# Patient Record
Sex: MALE | Race: WHITE | NOT HISPANIC OR LATINO | Employment: FULL TIME | URBAN - METROPOLITAN AREA
[De-identification: names, ages, dates, MRNs, and addresses within clinical notes are randomized per-mention and may not be internally consistent; named-entity substitution may affect disease eponyms.]

---

## 2018-01-18 NOTE — MISCELLANEOUS
Message  Return to work or school:   Adriana Alvarez is under my professional care  He was seen in my office on November 8, 2016   He is able to return to work on  November 9, 2016            Signatures   Electronically signed by :  Erinn El, ; Nov 8 2016  5:43PM EST                       (Author)

## 2019-01-18 ENCOUNTER — OFFICE VISIT (OUTPATIENT)
Dept: URGENT CARE | Facility: CLINIC | Age: 24
End: 2019-01-18
Payer: COMMERCIAL

## 2019-01-18 VITALS
DIASTOLIC BLOOD PRESSURE: 90 MMHG | WEIGHT: 144.8 LBS | TEMPERATURE: 98.9 F | SYSTOLIC BLOOD PRESSURE: 135 MMHG | RESPIRATION RATE: 20 BRPM | HEART RATE: 76 BPM | HEIGHT: 69 IN | OXYGEN SATURATION: 97 % | BODY MASS INDEX: 21.45 KG/M2

## 2019-01-18 DIAGNOSIS — K08.89 PAIN, DENTAL: Primary | ICD-10-CM

## 2019-01-18 PROCEDURE — 99213 OFFICE O/P EST LOW 20 MIN: CPT | Performed by: PHYSICIAN ASSISTANT

## 2019-01-18 PROCEDURE — S9088 SERVICES PROVIDED IN URGENT: HCPCS | Performed by: PHYSICIAN ASSISTANT

## 2019-01-18 RX ORDER — MELATONIN 10 MG
1 TABLET, SUBLINGUAL SUBLINGUAL DAILY
COMMUNITY

## 2019-01-18 RX ORDER — PENICILLIN V POTASSIUM 500 MG/1
500 TABLET ORAL EVERY 6 HOURS SCHEDULED
Qty: 28 TABLET | Refills: 0 | Status: SHIPPED | OUTPATIENT
Start: 2019-01-18 | End: 2019-01-25

## 2019-01-18 RX ORDER — OMEGA-3 FATTY ACIDS/FISH OIL 300-1000MG
1 CAPSULE ORAL
COMMUNITY

## 2019-01-18 NOTE — PROGRESS NOTES
[facility] [employer] [facility]        NAME: Olinda Villeda is a 24 y o  male  : 1995    MRN: [id number]  DATE: 2019  TIME: 12:40 PM    Assessment and Plan   Pain, dental [K08 89]  1  Pain, dental  penicillin V potassium (VEETID) 500 mg tablet     No evidence of acute infection or abscess  Discussed topical therapies to try and avoidance of hot and cold liquids  Patient complains of pressure under the tooth at times  A prescription for penicillin was sent to the pharmacy but patient agrees that he will not start any antibiotics unless the topical therapies do not work for his symptoms get worse  He has plans to see his dentist on Monday or Tuesday, they were booked for today  Patient Instructions     Patient Instructions   Try conservative topical measures 1st but if symptoms worsen, he developed swelling, fever or increased pain he can begin the antibiotic until you can follow-up with your dentist         Proceed to  ER if symptoms worsen  Chief Complaint     Chief Complaint   Patient presents with    Dental Pain     patient reports he has had pain right lower back molar x 2 days,with sensitiviy to heat and cold  unable to get into dentist Took Ibuprofen 400mg at 0830 with short term releif  History of Present Illness       Patient presents with chief complaint of right lower dental pain for the past 2 days  He states he had a filling placed in this tooth in November  He reports after having the dental work the filling felt large and he would bite down on it from time to time  He had some pain in the tooth initially but resolved  He states that the tooth is very sensitive to hot and cold  The pain is described as sharp at times radiating in the lower jaw or up  He reports feeling occasional pressure beneath the 2s  He is taking ibuprofen currently  He denies any fever  No injury to the tooth          Review of Systems   Review of Systems   Constitutional: Negative for chills and fever    HENT: Positive for dental problem  Allergic/Immunologic: Negative  Hematological: Negative  Current Medications       Current Outpatient Prescriptions:     Cholecalciferol (VITAMIN D3) 64968 units TABS, Take 1 tablet by mouth daily, Disp: , Rfl:     Ibuprofen 200 MG CAPS, Take 1 capsule by mouth, Disp: , Rfl:     penicillin V potassium (VEETID) 500 mg tablet, Take 1 tablet (500 mg total) by mouth every 6 (six) hours for 7 days, Disp: 28 tablet, Rfl: 0    Current Allergies     Allergies as of 01/18/2019    (No Known Allergies)            The following portions of the patient's history were reviewed and updated as appropriate: allergies, current medications, past family history, past medical history, past social history, past surgical history and problem list      History reviewed  No pertinent past medical history  History reviewed  No pertinent surgical history  No family history on file  Medications have been verified  Objective   /90   Pulse 76   Temp 98 9 °F (37 2 °C)   Resp 20   Ht 5' 9" (1 753 m)   Wt 65 7 kg (144 lb 12 8 oz)   SpO2 97%   BMI 21 38 kg/m²        Physical Exam     Physical Exam   Constitutional: He is oriented to person, place, and time  HENT:   Mouth/Throat: Oropharynx is clear and moist and mucous membranes are normal    Lower right molar with white filling  The filling is not visibly broken  Teeth are in very good repair  There is no swelling at the gum line or redness  The tooth is not tender to palpation  Cardiovascular: Normal rate and regular rhythm  Pulmonary/Chest: Effort normal and breath sounds normal    Lymphadenopathy:        Head (right side): No submental and no submandibular adenopathy present  Head (left side): No submental and no submandibular adenopathy present  He has no cervical adenopathy  Neurological: He is alert and oriented to person, place, and time     Nursing note and vitals reviewed

## 2019-08-09 ENCOUNTER — OFFICE VISIT (OUTPATIENT)
Dept: FAMILY MEDICINE CLINIC | Facility: CLINIC | Age: 24
End: 2019-08-09
Payer: COMMERCIAL

## 2019-08-09 VITALS
SYSTOLIC BLOOD PRESSURE: 120 MMHG | HEIGHT: 69 IN | RESPIRATION RATE: 12 BRPM | HEART RATE: 69 BPM | BODY MASS INDEX: 21.45 KG/M2 | DIASTOLIC BLOOD PRESSURE: 90 MMHG | OXYGEN SATURATION: 98 % | WEIGHT: 144.8 LBS

## 2019-08-09 DIAGNOSIS — Z23 NEED FOR TETANUS BOOSTER: ICD-10-CM

## 2019-08-09 DIAGNOSIS — Z23 NEED FOR VARICELLA VACCINE: ICD-10-CM

## 2019-08-09 DIAGNOSIS — Z00.00 ANNUAL PHYSICAL EXAM: Primary | ICD-10-CM

## 2019-08-09 DIAGNOSIS — Z23 NEED FOR MENINGOCOCCAL VACCINATION: ICD-10-CM

## 2019-08-09 PROCEDURE — 90715 TDAP VACCINE 7 YRS/> IM: CPT

## 2019-08-09 PROCEDURE — 90716 VAR VACCINE LIVE SUBQ: CPT

## 2019-08-09 PROCEDURE — 90471 IMMUNIZATION ADMIN: CPT

## 2019-08-09 PROCEDURE — 99385 PREV VISIT NEW AGE 18-39: CPT | Performed by: NURSE PRACTITIONER

## 2019-08-09 PROCEDURE — 90734 MENACWYD/MENACWYCRM VACC IM: CPT

## 2019-08-09 NOTE — PATIENT INSTRUCTIONS

## 2019-08-09 NOTE — PROGRESS NOTES
ADULT ANNUAL PHYSICAL  [facility]    NAME: Millie Malcolm  AGE: 21 y o  SEX: male  : 1995     DATE: 2019     Assessment and Plan:     Problem List Items Addressed This Visit     None      Visit Diagnoses     Annual physical exam    -  Primary      here as a new patient  I take care of his parents      Immunizations and preventive care screenings were discussed with patient today  Appropriate education was printed on patient's after visit summary  Counseling:  · Dental Health: discussed importance of regular tooth brushing, flossing, and dental visits  Return in 1 year (on 2020)  Chief Complaint:     Chief Complaint   Patient presents with    Physical Exam     pt here for physical       History of Present Illness:     Adult Annual Physical   Patient here for a comprehensive physical exam  The patient reports no problems  Diet and Physical Activity  · Diet/Nutrition: well balanced diet  · Exercise: 3-4 times a week on average  Depression Screening  PHQ-9 Depression Screening    PHQ-9:    Frequency of the following problems over the past two weeks:            General Health  · Sleep: sleeps well  · Hearing: normal - bilateral   · Vision: no vision problems  · Dental: regular dental visits   Health  · History of STDs?: no      Review of Systems:     Review of Systems   Constitutional: Negative for fatigue and fever  HENT: Negative for congestion, postnasal drip and rhinorrhea  Eyes: Negative for photophobia and visual disturbance  Gastrointestinal: Positive for nausea  Negative for constipation, diarrhea and vomiting  Genitourinary: Negative for dysuria, frequency and hematuria  Musculoskeletal: Positive for back pain  Negative for arthralgias, myalgias and neck stiffness  Skin: Negative for rash  Neurological: Negative for dizziness, light-headedness and headaches  Hematological: Negative for adenopathy  Psychiatric/Behavioral: Negative for decreased concentration and dysphoric mood  The patient is not nervous/anxious  Past Medical History:     History reviewed  No pertinent past medical history  Past Surgical History:     Past Surgical History:   Procedure Laterality Date    TOOTH EXTRACTION        Social History:     Social History     Socioeconomic History    Marital status: Single     Spouse name: None    Number of children: None    Years of education: None    Highest education level: None   Occupational History    None   Social Needs    Financial resource strain: None    Food insecurity:     Worry: None     Inability: None    Transportation needs:     Medical: None     Non-medical: None   Tobacco Use    Smoking status: Never Smoker    Smokeless tobacco: Never Used   Substance and Sexual Activity    Alcohol use: Yes    Drug use: None    Sexual activity: None   Lifestyle    Physical activity:     Days per week: None     Minutes per session: None    Stress: None   Relationships    Social connections:     Talks on phone: None     Gets together: None     Attends Faith service: None     Active member of club or organization: None     Attends meetings of clubs or organizations: None     Relationship status: None    Intimate partner violence:     Fear of current or ex partner: None     Emotionally abused: None     Physically abused: None     Forced sexual activity: None   Other Topics Concern    None   Social History Narrative    Drinks coffee      Family History:     History reviewed  No pertinent family history  Current Medications:     Current Outpatient Medications   Medication Sig Dispense Refill    Cholecalciferol (VITAMIN D3) 36811 units TABS Take 1 tablet by mouth daily      Ibuprofen 200 MG CAPS Take 1 capsule by mouth       No current facility-administered medications for this visit         Allergies:     No Known Allergies   Physical Exam:     /90 (BP Location: Left arm, Patient Position: Sitting, Cuff Size: Standard)   Pulse 69   Resp 12   Ht 5' 9" (1 753 m)   Wt 65 7 kg (144 lb 12 8 oz)   SpO2 98%   BMI 21 38 kg/m²       Family History   Problem Relation Age of Onset    Asthma Mother     LEONIDES disease Father      Past Medical History:   Diagnosis Date    Lyme arthritis ([facility] )      Social History     Socioeconomic History    Marital status: Single     Spouse name: Not on file    Number of children: Not on file    Years of education: Not on file    Highest education level: Not on file   Occupational History    Not on file   Social Needs    Financial resource strain: Not on file    Food insecurity:     Worry: Not on file     Inability: Not on file    Transportation needs:     Medical: Not on file     Non-medical: Not on file   Tobacco Use    Smoking status: Never Smoker    Smokeless tobacco: Never Used   Substance and Sexual Activity    Alcohol use: Yes     Frequency: Monthly or less    Drug use: Never    Sexual activity: Not Currently   Lifestyle    Physical activity:     Days per week: Not on file     Minutes per session: Not on file    Stress: Not on file   Relationships    Social connections:     Talks on phone: Not on file     Gets together: Not on file     Attends Mandaeism service: Not on file     Active member of club or organization: Not on file     Attends meetings of clubs or organizations: Not on file     Relationship status: Not on file    Intimate partner violence:     Fear of current or ex partner: Not on file     Emotionally abused: Not on file     Physically abused: Not on file     Forced sexual activity: Not on file   Other Topics Concern    Not on file   Social History Narrative    Drinks coffee       Current Outpatient Medications:     Cholecalciferol (VITAMIN D3) 11877 units TABS, Take 1 tablet by mouth daily, Disp: , Rfl:     Ibuprofen 200 MG CAPS, Take 1 capsule by mouth, Disp: , Rfl:   No Known Allergies  Physical Exam Constitutional: He is oriented to person, place, and time  He appears well-developed and well-nourished  HENT:   Head: Normocephalic  Right Ear: External ear normal    Left Ear: External ear normal    Nose: Nose normal    Mouth/Throat: Oropharynx is clear and moist    Eyes: Conjunctivae are normal    Neck: Normal range of motion  Neck supple  Cardiovascular: Normal rate, regular rhythm and normal heart sounds  Pulmonary/Chest: Effort normal and breath sounds normal    Abdominal: Soft  Bowel sounds are normal    Musculoskeletal: Normal range of motion  Neurological: He is alert and oriented to person, place, and time  Skin: Skin is warm and dry  Capillary refill takes less than 2 seconds  Psychiatric: He has a normal mood and affect  His behavior is normal  Judgment and thought content normal    Nursing note and vitals reviewed        Joseluis De Leon, [address] [place] [address]

## 2019-08-24 ENCOUNTER — APPOINTMENT (OUTPATIENT)
Dept: LAB | Facility: CLINIC | Age: 24
End: 2019-08-24
Payer: COMMERCIAL

## 2019-08-24 DIAGNOSIS — Z23 NEED FOR TETANUS BOOSTER: ICD-10-CM

## 2019-08-24 DIAGNOSIS — Z23 NEED FOR VARICELLA VACCINE: ICD-10-CM

## 2019-08-24 DIAGNOSIS — Z00.00 ANNUAL PHYSICAL EXAM: ICD-10-CM

## 2019-08-24 DIAGNOSIS — Z23 NEED FOR MENINGOCOCCAL VACCINATION: ICD-10-CM

## 2019-08-24 LAB
ALBUMIN SERPL BCP-MCNC: 4.3 G/DL (ref 3.5–5)
ALP SERPL-CCNC: 53 U/L (ref 46–116)
ALT SERPL W P-5'-P-CCNC: 26 U/L (ref 12–78)
ANION GAP SERPL CALCULATED.3IONS-SCNC: 5 MMOL/L (ref 4–13)
AST SERPL W P-5'-P-CCNC: 15 U/L (ref 5–45)
BASOPHILS # BLD AUTO: 0.03 THOUSANDS/ΜL (ref 0–0.1)
BASOPHILS NFR BLD AUTO: 1 % (ref 0–1)
BILIRUB SERPL-MCNC: 1.05 MG/DL (ref 0.2–1)
BUN SERPL-MCNC: 15 MG/DL (ref 5–25)
CALCIUM SERPL-MCNC: 9.2 MG/DL (ref 8.3–10.1)
CHLORIDE SERPL-SCNC: 106 MMOL/L (ref 100–108)
CHOLEST SERPL-MCNC: 171 MG/DL (ref 50–200)
CO2 SERPL-SCNC: 29 MMOL/L (ref 21–32)
CREAT SERPL-MCNC: 0.96 MG/DL (ref 0.6–1.3)
EOSINOPHIL # BLD AUTO: 0.18 THOUSAND/ΜL (ref 0–0.61)
EOSINOPHIL NFR BLD AUTO: 4 % (ref 0–6)
ERYTHROCYTE [DISTWIDTH] IN BLOOD BY AUTOMATED COUNT: 11.9 % (ref 11.6–15.1)
GFR SERPL CREATININE-BSD FRML MDRD: 109 ML/MIN/1.73SQ M
GLUCOSE P FAST SERPL-MCNC: 86 MG/DL (ref 65–99)
HCT VFR BLD AUTO: 42.9 % (ref 36.5–49.3)
HDLC SERPL-MCNC: 48 MG/DL (ref 40–60)
HGB BLD-MCNC: 14.6 G/DL (ref 12–17)
IMM GRANULOCYTES # BLD AUTO: 0 THOUSAND/UL (ref 0–0.2)
IMM GRANULOCYTES NFR BLD AUTO: 0 % (ref 0–2)
LDLC SERPL CALC-MCNC: 110 MG/DL (ref 0–100)
LYMPHOCYTES # BLD AUTO: 1.79 THOUSANDS/ΜL (ref 0.6–4.47)
LYMPHOCYTES NFR BLD AUTO: 40 % (ref 14–44)
MAGNESIUM SERPL-MCNC: 2.2 MG/DL (ref 1.6–2.6)
MCH RBC QN AUTO: 29.6 PG (ref 26.8–34.3)
MCHC RBC AUTO-ENTMCNC: 34 G/DL (ref 31.4–37.4)
MCV RBC AUTO: 87 FL (ref 82–98)
MONOCYTES # BLD AUTO: 0.39 THOUSAND/ΜL (ref 0.17–1.22)
MONOCYTES NFR BLD AUTO: 9 % (ref 4–12)
NEUTROPHILS # BLD AUTO: 2.08 THOUSANDS/ΜL (ref 1.85–7.62)
NEUTS SEG NFR BLD AUTO: 46 % (ref 43–75)
NRBC BLD AUTO-RTO: 0 /100 WBCS
PLATELET # BLD AUTO: 226 THOUSANDS/UL (ref 149–390)
PMV BLD AUTO: 9.5 FL (ref 8.9–12.7)
POTASSIUM SERPL-SCNC: 3.8 MMOL/L (ref 3.5–5.3)
PROT SERPL-MCNC: 7.5 G/DL (ref 6.4–8.2)
RBC # BLD AUTO: 4.93 MILLION/UL (ref 3.88–5.62)
SODIUM SERPL-SCNC: 140 MMOL/L (ref 136–145)
TRIGL SERPL-MCNC: 64 MG/DL
TSH SERPL DL<=0.05 MIU/L-ACNC: 2.36 UIU/ML (ref 0.36–3.74)
WBC # BLD AUTO: 4.47 THOUSAND/UL (ref 4.31–10.16)

## 2019-08-24 PROCEDURE — 83735 ASSAY OF MAGNESIUM: CPT

## 2019-08-24 PROCEDURE — 80061 LIPID PANEL: CPT

## 2019-08-24 PROCEDURE — 84443 ASSAY THYROID STIM HORMONE: CPT

## 2019-08-24 PROCEDURE — 36415 COLL VENOUS BLD VENIPUNCTURE: CPT

## 2019-08-24 PROCEDURE — 85025 COMPLETE CBC W/AUTO DIFF WBC: CPT

## 2019-08-24 PROCEDURE — 80053 COMPREHEN METABOLIC PANEL: CPT

## 2020-04-07 ENCOUNTER — TELEMEDICINE (OUTPATIENT)
Dept: FAMILY MEDICINE CLINIC | Facility: CLINIC | Age: 25
End: 2020-04-07
Payer: COMMERCIAL

## 2020-04-07 VITALS — TEMPERATURE: 98.5 F | BODY MASS INDEX: 21.21 KG/M2 | WEIGHT: 143.6 LBS

## 2020-04-07 DIAGNOSIS — R07.89 CHEST PRESSURE: Primary | ICD-10-CM

## 2020-04-07 PROCEDURE — 99214 OFFICE O/P EST MOD 30 MIN: CPT | Performed by: NURSE PRACTITIONER

## 2020-04-07 RX ORDER — MULTIVITAMIN
1 TABLET ORAL DAILY
COMMUNITY

## 2020-04-10 ENCOUNTER — APPOINTMENT (OUTPATIENT)
Dept: RADIOLOGY | Age: 25
End: 2020-04-10
Payer: COMMERCIAL

## 2020-04-10 DIAGNOSIS — R07.89 CHEST PRESSURE: ICD-10-CM

## 2020-04-10 PROCEDURE — 71046 X-RAY EXAM CHEST 2 VIEWS: CPT

## 2020-09-15 DIAGNOSIS — R07.89 CHEST PRESSURE: Primary | ICD-10-CM

## 2020-09-15 DIAGNOSIS — R00.2 PALPITATIONS: ICD-10-CM

## 2021-06-15 ENCOUNTER — TELEMEDICINE (OUTPATIENT)
Dept: FAMILY MEDICINE CLINIC | Facility: CLINIC | Age: 26
End: 2021-06-15

## 2021-06-15 VITALS — BODY MASS INDEX: 21.18 KG/M2 | WEIGHT: 143 LBS | HEIGHT: 69 IN

## 2021-06-15 DIAGNOSIS — R07.89 CHEST PRESSURE: Primary | ICD-10-CM

## 2021-06-15 PROCEDURE — 99213 OFFICE O/P EST LOW 20 MIN: CPT | Performed by: NURSE PRACTITIONER

## 2021-06-15 NOTE — PROGRESS NOTES
FAMILY PRACTICE OFFICE VISIT       NAME: Abner Chin  AGE: 31 y o  SEX: male       : 1995        MRN: [id number]    DATE: 6/15/2021  TIME: 7:04 PM    Assessment and Plan   1  Chest pressure  Assessment & Plan: To see cardiology for f/u                    Chief Complaint     Chief Complaint   Patient presents with    Follow-up     chest pain       History of Present Illness   Sailaja Turner is a 31y o -year-old male who wants to discuss covid vaccine  School is mandating covid vaccine for summer classes  He is still getting work up for his intermittent chest pain  He would like to wait until after work up before he proceeds with vaccine      Review of Systems   Review of Systems   Constitutional: Negative for fatigue and fever  Respiratory: Positive for chest tightness  Negative for cough, shortness of breath and wheezing  Cardiovascular: Negative for chest pain and palpitations  Gastrointestinal: Negative for constipation and diarrhea  Musculoskeletal: Negative for arthralgias and myalgias  Skin: Negative for rash  Neurological: Negative for dizziness and headaches  Hematological: Negative for adenopathy  Psychiatric/Behavioral: Negative for dysphoric mood and sleep disturbance  The patient is not nervous/anxious          Active Problem List     Patient Active Problem List   Diagnosis    Chest pressure         Past Medical History:  Past Medical History:   Diagnosis Date    Lyme arthritis ([facility] )        Past Surgical History:  Past Surgical History:   Procedure Laterality Date    TOOTH EXTRACTION         Family History:  Family History   Problem Relation Age of Onset    Asthma Mother     LEONIDES disease Father        Social History:  Social History     Socioeconomic History    Marital status: Single     Spouse name: Not on file    Number of children: Not on file    Years of education: Not on file    Highest education level: Not on file   Occupational History    Not on file   Tobacco Use    Smoking status: Never Smoker    Smokeless tobacco: Never Used   Substance and Sexual Activity    Alcohol use: Yes    Drug use: Never    Sexual activity: Not Currently   Other Topics Concern    Not on file   Social History Narrative    Drinks coffee     Social Determinants of Health     Financial Resource Strain:     Difficulty of Paying Living Expenses:    Food Insecurity:     Worried About Running Out of Food in the Last Year:     [address] in the Last Year:    Transportation Needs:     Lack of Transportation (Medical):  Lack of Transportation (Non-Medical):    Physical Activity:     Days of Exercise per Week:     Minutes of Exercise per Session:    Stress:     Feeling of Stress :    Social Connections:     Frequency of Communication with Friends and Family:     Frequency of Social Gatherings with Friends and Family:     Attends Nondenominational Services:     Active Member of Clubs or Organizations:     Attends Club or Organization Meetings:     Marital Status:    Intimate Partner Violence:     Fear of Current or Ex-Partner:     Emotionally Abused:     Physically Abused:     Sexually Abused:        Objective   There were no vitals filed for this visit  Wt Readings from Last 3 Encounters:   06/15/21 64 9 kg (143 lb)   04/07/20 65 1 kg (143 lb 9 6 oz)   08/09/19 65 7 kg (144 lb 12 8 oz)       Physical Exam  Constitutional:       Appearance: Normal appearance  HENT:      Head: Normocephalic and atraumatic  Eyes:      Conjunctiva/sclera: Conjunctivae normal    Pulmonary:      Effort: Pulmonary effort is normal    Musculoskeletal:         General: Normal range of motion  Neurological:      Mental Status: He is alert and oriented to person, place, and time     Psychiatric:         Mood and Affect: Mood normal          Behavior: Behavior normal          Pertinent Laboratory/Diagnostic Studies:  Lab Results   Component Value Date    BUN 15 08/24/2019    CREATININE 0 96 08/24/2019 CALCIUM 9 2 08/24/2019    K 3 8 08/24/2019    CO2 29 08/24/2019     08/24/2019     Lab Results   Component Value Date    ALT 26 08/24/2019    AST 15 08/24/2019    ALKPHOS 53 08/24/2019       Lab Results   Component Value Date    WBC 4 47 08/24/2019    HGB 14 6 08/24/2019    HCT 42 9 08/24/2019    MCV 87 08/24/2019     08/24/2019       No results found for: TSH    No results found for: CHOL  Lab Results   Component Value Date    TRIG 64 08/24/2019     Lab Results   Component Value Date    HDL 48 08/24/2019     Lab Results   Component Value Date    LDLCALC 110 (H) 08/24/2019     No results found for: HGBA1C    Results for orders placed or performed in visit on 08/24/19   CBC and differential   Result Value Ref Range    WBC 4 47 4 31 - 10 16 Thousand/uL    RBC 4 93 3 88 - 5 62 Million/uL    Hemoglobin 14 6 12 0 - 17 0 g/dL    Hematocrit 42 9 36 5 - 49 3 %    MCV 87 82 - 98 fL    MCH 29 6 26 8 - 34 3 pg    MCHC 34 0 31 4 - 37 4 g/dL    RDW 11 9 11 6 - 15 1 %    MPV 9 5 8 9 - 12 7 fL    Platelets 443 206 - 915 Thousands/uL    nRBC 0 /100 WBCs    Neutrophils Relative 46 43 - 75 %    Immat GRANS % 0 0 - 2 %    Lymphocytes Relative 40 14 - 44 %    Monocytes Relative 9 4 - 12 %    Eosinophils Relative 4 0 - 6 %    Basophils Relative 1 0 - 1 %    Neutrophils Absolute 2 08 1 85 - 7 62 Thousands/µL    Immature Grans Absolute 0 00 0 00 - 0 20 Thousand/uL    Lymphocytes Absolute 1 79 0 60 - 4 47 Thousands/µL    Monocytes Absolute 0 39 0 17 - 1 22 Thousand/µL    Eosinophils Absolute 0 18 0 00 - 0 61 Thousand/µL    Basophils Absolute 0 03 0 00 - 0 10 Thousands/µL   Comprehensive metabolic panel   Result Value Ref Range    Sodium 140 136 - 145 mmol/L    Potassium 3 8 3 5 - 5 3 mmol/L    Chloride 106 100 - 108 mmol/L    CO2 29 21 - 32 mmol/L    ANION GAP 5 4 - 13 mmol/L    BUN 15 5 - 25 mg/dL    Creatinine 0 96 0 60 - 1 30 mg/dL    Glucose, Fasting 86 65 - 99 mg/dL    Calcium 9 2 8 3 - 10 1 mg/dL    AST 15 5 - 45 U/L ALT 26 12 - 78 U/L    Alkaline Phosphatase 53 46 - 116 U/L    Total Protein 7 5 6 4 - 8 2 g/dL    Albumin 4 3 3 5 - 5 0 g/dL    Total Bilirubin 1 05 (H) 0 20 - 1 00 mg/dL    eGFR 109 ml/min/1 73sq m   Magnesium   Result Value Ref Range    Magnesium 2 2 1 6 - 2 6 mg/dL   Lipid Panel with Direct LDL reflex   Result Value Ref Range    Cholesterol 171 50 - 200 mg/dL    Triglycerides 64 <=150 mg/dL    HDL, Direct 48 40 - 60 mg/dL    LDL Calculated 110 (H) 0 - 100 mg/dL   TSH, 3rd generation with Free T4 reflex   Result Value Ref Range    TSH 3RD GENERATON 2 360 0 358 - 3 740 uIU/mL       No orders of the defined types were placed in this encounter  ALLERGIES:  Allergies   Allergen Reactions    Sulfa Antibiotics        Current Medications     Current Outpatient Medications   Medication Sig Dispense Refill    Cholecalciferol (VITAMIN D3) 72527 units TABS Take 1 tablet by mouth daily      Ibuprofen 200 MG CAPS Take 1 capsule by mouth      Multiple Vitamin (MULTIVITAMIN) tablet Take 1 tablet by mouth daily       No current facility-administered medications for this visit           Health Maintenance     Health Maintenance   Topic Date Due    COVID-19 Vaccine (1) Never done    BMI: Adult  Never done    Annual Physical  08/09/2020    Influenza Vaccine (Season Ended) 09/01/2021    HIV Screening  06/16/2023 (Originally 5/2/2009)    Hepatitis C Screening  07/15/2023 (Originally 1995)    Depression Screening PHQ  06/15/2022    DTaP,Tdap,and Td Vaccines (2 - Td or Tdap) 08/09/2029    Hepatitis B Vaccine  Completed    Pneumococcal Vaccine: Pediatrics (0 to 5 Years) and At-Risk Patients (6 to 59 Years)  Aged Out    HIB Vaccine  Aged Out    IPV Vaccine  Aged Out    Hepatitis A Vaccine  Aged Out    Meningococcal ACWY Vaccine  Aged Out    HPV Vaccine  Aged [employer] History   Administered Date(s) Administered    Hep B, Adolescent or Pediatric 1995, [birth year]    HiB 1995, [birth year]    IPV 1995, 1995, 1994, 09/21/1995    MMR 06/21/1995, 05/21/1998    Meningococcal ACWY, unspecified 07/06/2005    Meningococcal MCV4P 08/09/2019    Meningococcal Polysaccharide (MPSV4) 07/06/2005    Tdap 08/09/2019    Varicella 06/21/1995, 08/09/2019          HONORIO Griffin

## 2025-03-11 ENCOUNTER — OFFICE VISIT (OUTPATIENT)
Dept: FAMILY MEDICINE CLINIC | Facility: CLINIC | Age: 30
End: 2025-03-11

## 2025-03-11 VITALS
HEIGHT: 67 IN | HEART RATE: 82 BPM | RESPIRATION RATE: 17 BRPM | WEIGHT: 141 LBS | DIASTOLIC BLOOD PRESSURE: 80 MMHG | TEMPERATURE: 98.2 F | SYSTOLIC BLOOD PRESSURE: 120 MMHG | BODY MASS INDEX: 22.13 KG/M2 | OXYGEN SATURATION: 97 %

## 2025-03-11 DIAGNOSIS — J30.1 SEASONAL ALLERGIC RHINITIS DUE TO POLLEN: ICD-10-CM

## 2025-03-11 DIAGNOSIS — J02.9 SORE THROAT: Primary | ICD-10-CM

## 2025-03-11 DIAGNOSIS — K21.9 GASTROESOPHAGEAL REFLUX DISEASE WITHOUT ESOPHAGITIS: ICD-10-CM

## 2025-03-11 DIAGNOSIS — Z78.9 VEGETARIAN DIET: ICD-10-CM

## 2025-03-11 PROBLEM — R09.89 THROAT TIGHTNESS: Status: ACTIVE | Noted: 2025-03-02

## 2025-03-11 PROBLEM — R07.0 THROAT BURNING: Status: ACTIVE | Noted: 2025-03-06

## 2025-03-11 LAB — S PYO AG THROAT QL: NEGATIVE

## 2025-03-11 PROCEDURE — 99213 OFFICE O/P EST LOW 20 MIN: CPT | Performed by: NURSE PRACTITIONER

## 2025-03-11 PROCEDURE — 87880 STREP A ASSAY W/OPTIC: CPT | Performed by: NURSE PRACTITIONER

## 2025-03-11 PROCEDURE — 87070 CULTURE OTHR SPECIMN AEROBIC: CPT | Performed by: NURSE PRACTITIONER

## 2025-03-11 RX ORDER — LORATADINE 10 MG/1
10 TABLET ORAL DAILY
Qty: 30 TABLET | Refills: 3 | Status: SHIPPED | OUTPATIENT
Start: 2025-03-11

## 2025-03-11 RX ORDER — FAMOTIDINE 20 MG/1
20 TABLET, FILM COATED ORAL 2 TIMES DAILY
Qty: 60 TABLET | Refills: 1 | Status: SHIPPED | OUTPATIENT
Start: 2025-03-11 | End: 2025-03-11 | Stop reason: SDUPTHER

## 2025-03-11 RX ORDER — FAMOTIDINE 20 MG/1
20 TABLET, FILM COATED ORAL 2 TIMES DAILY
Qty: 60 TABLET | Refills: 1 | Status: SHIPPED | OUTPATIENT
Start: 2025-03-11

## 2025-03-11 NOTE — PROGRESS NOTES
Name: Leopoldo Chin      : 1995      MRN: [id number]  Encounter Provider: HONORIO Mancera  Encounter Date: 3/11/2025   Encounter department: [facility]    Assessment & Plan  Sore throat    Orders:    POCT rapid ANTIGEN strepA    Throat culture; Future    Vegetarian diet    Orders:    Vitamin B12; Future    CBC and differential; Future    Iron Panel (Includes Ferritin, Iron Sat%, Iron, and TIBC); Future    Zinc; Future    Comprehensive metabolic panel; Future    Gastroesophageal reflux disease without esophagitis    Orders:    famotidine (PEPCID) 20 mg tablet; Take 1 tablet (20 mg total) by mouth 2 (two) times a day    Seasonal allergic rhinitis due to pollen    Orders:    loratadine (CLARITIN) 10 mg tablet; Take 1 tablet (10 mg total) by mouth daily      Recent Results (from the past week)   POCT rapid ANTIGEN strepA    Collection Time: 25  2:46 PM   Result Value Ref Range     RAPID STREP A Negative Negative       Depression Screening and Follow-up Plan: Patient was screened for depression during today's encounter. They screened negative with a PHQ-2 score of 0.          History of Present Illness     Here for c/o sore throat on and off since   Comes and goes  This past week started again  Feels tight around the neck  Does not hurt to swallow  Does not have trouble swallowing  Gets sore in back of throat   Gets pain in front of neck  This past week thinks neck may look swollen  On occasion can have indigestion  On feb 2 had stomach pains  Comes and goes every weekend, no appetite and then resolved    Sore Throat   This is a new problem. The current episode started 1 to 4 weeks ago. The problem has been waxing and waning. Neither side of throat is experiencing more pain than the other. There has been no fever. The pain is at a severity of 3/10. The pain is mild. Associated symptoms include neck pain. Pertinent negatives include no abdominal pain, congestion, coughing,  headaches or shortness of breath.     Review of Systems   Constitutional:  Negative for activity change, appetite change, fatigue and fever.   HENT:  Positive for sore throat. Negative for congestion, postnasal drip, rhinorrhea, sinus pressure and sinus pain.    Respiratory:  Negative for cough, shortness of breath and wheezing.    Cardiovascular:  Negative for chest pain and palpitations.   Gastrointestinal:  Negative for abdominal pain and constipation.   Genitourinary:  Negative for dysuria and frequency.   Musculoskeletal:  Positive for neck pain. Negative for arthralgias.   Skin:  Negative for rash.   Neurological:  Negative for dizziness, light-headedness and headaches.   Hematological:  Negative for adenopathy.   Psychiatric/Behavioral:  Negative for dysphoric mood and sleep disturbance. The patient is not nervous/anxious.      Past Medical History:   Diagnosis Date    Lyme arthritis (HCC)      Past Surgical History:   Procedure Laterality Date    TOOTH EXTRACTION       Family History   Problem Relation Age of Onset    Asthma Mother     LEONIDES disease Father      Social History     Tobacco Use    Smoking status: Never    Smokeless tobacco: Never   Vaping Use    Vaping status: Never Used   Substance and Sexual Activity    Alcohol use: Yes    Drug use: Never    Sexual activity: Not Currently     Current Outpatient Medications on File Prior to Visit   Medication Sig    [DISCONTINUED] Cholecalciferol (VITAMIN D3) 35881 units TABS Take 1 tablet by mouth daily    [DISCONTINUED] Ibuprofen 200 MG CAPS Take 1 capsule by mouth    [DISCONTINUED] Multiple Vitamin (MULTIVITAMIN) tablet Take 1 tablet by mouth daily     Allergies   Allergen Reactions    Sulfa Antibiotics      Immunization History   Administered Date(s) Administered    Hep B, Adolescent or Pediatric 1995, 1995, 1995    HiB 1995, 1995, 1994    IPV 1995, 1994, 1994, 09/21/1995    MMR 06/21/1995, 05/21/1998     "Meningococcal ACWY, unspecified 07/06/2005    Meningococcal MCV4P 08/09/2019    Meningococcal Polysaccharide (MPSV4) 07/06/2005    Tdap 08/09/2019    Varicella 06/21/1995, 08/09/2019     Objective   /80 (BP Location: Left arm, Patient Position: Sitting, Cuff Size: Standard)   Pulse 82   Temp 98.2 °F (36.8 °C) (Tympanic)   Resp 17   Ht 5' 7\" (1.702 m)   Wt 64 kg (141 lb)   SpO2 97%   BMI 22.08 kg/m²     Physical Exam  Vitals and nursing note reviewed.   Constitutional:       Appearance: Normal appearance.   HENT:      Head: Normocephalic and atraumatic.      Right Ear: Tympanic membrane, ear canal and external ear normal.      Left Ear: Tympanic membrane, ear canal and external ear normal.      Nose: Nose normal.      Mouth/Throat:      Mouth: Mucous membranes are moist.      Pharynx: Posterior oropharyngeal erythema present.   Eyes:      Conjunctiva/sclera: Conjunctivae normal.   Cardiovascular:      Rate and Rhythm: Normal rate and regular rhythm.      Pulses: Normal pulses.      Heart sounds: Normal heart sounds.   Pulmonary:      Effort: Pulmonary effort is normal.      Breath sounds: Normal breath sounds.   Abdominal:      General: Bowel sounds are normal.      Palpations: Abdomen is soft.   Musculoskeletal:         General: Normal range of motion.      Cervical back: Normal range of motion and neck supple.   Skin:     General: Skin is warm and dry.      Capillary Refill: Capillary refill takes less than 2 seconds.   Neurological:      General: No focal deficit present.      Mental Status: He is alert and oriented to person, place, and time.   Psychiatric:         Mood and Affect: Mood normal.         Behavior: Behavior normal.         Thought Content: Thought content normal.         Judgment: Judgment normal.         "

## 2025-03-13 ENCOUNTER — RESULTS FOLLOW-UP (OUTPATIENT)
Dept: FAMILY MEDICINE CLINIC | Facility: CLINIC | Age: 30
End: 2025-03-13

## 2025-03-13 LAB — BACTERIA THROAT CULT: NORMAL

## 2025-03-20 ENCOUNTER — APPOINTMENT (OUTPATIENT)
Dept: LAB | Facility: HOSPITAL | Age: 30
End: 2025-03-20
Payer: COMMERCIAL

## 2025-03-20 DIAGNOSIS — Z78.9 VEGETARIAN DIET: ICD-10-CM

## 2025-03-20 LAB
ALBUMIN SERPL BCG-MCNC: 5.1 G/DL (ref 3.5–5)
ALP SERPL-CCNC: 44 U/L (ref 34–104)
ALT SERPL W P-5'-P-CCNC: 18 U/L (ref 7–52)
ANION GAP SERPL CALCULATED.3IONS-SCNC: 8 MMOL/L (ref 4–13)
AST SERPL W P-5'-P-CCNC: 22 U/L (ref 13–39)
BASOPHILS # BLD AUTO: 0.05 THOUSANDS/ÂΜL (ref 0–0.1)
BASOPHILS NFR BLD AUTO: 1 % (ref 0–1)
BILIRUB SERPL-MCNC: 1.08 MG/DL (ref 0.2–1)
BUN SERPL-MCNC: 17 MG/DL (ref 5–25)
CALCIUM SERPL-MCNC: 10 MG/DL (ref 8.4–10.2)
CHLORIDE SERPL-SCNC: 104 MMOL/L (ref 96–108)
CO2 SERPL-SCNC: 28 MMOL/L (ref 21–32)
CREAT SERPL-MCNC: 0.89 MG/DL (ref 0.6–1.3)
EOSINOPHIL # BLD AUTO: 0.11 THOUSAND/ÂΜL (ref 0–0.61)
EOSINOPHIL NFR BLD AUTO: 1 % (ref 0–6)
ERYTHROCYTE [DISTWIDTH] IN BLOOD BY AUTOMATED COUNT: 11.9 % (ref 11.6–15.1)
FERRITIN SERPL-MCNC: 81 NG/ML (ref 24–336)
GFR SERPL CREATININE-BSD FRML MDRD: 114 ML/MIN/1.73SQ M
GLUCOSE P FAST SERPL-MCNC: 81 MG/DL (ref 65–99)
HCT VFR BLD AUTO: 43.1 % (ref 36.5–49.3)
HGB BLD-MCNC: 15.1 G/DL (ref 12–17)
IMM GRANULOCYTES # BLD AUTO: 0.02 THOUSAND/UL (ref 0–0.2)
IMM GRANULOCYTES NFR BLD AUTO: 0 % (ref 0–2)
IRON SATN MFR SERPL: 57 % (ref 15–50)
IRON SERPL-MCNC: 176 UG/DL (ref 50–212)
LYMPHOCYTES # BLD AUTO: 2.12 THOUSANDS/ÂΜL (ref 0.6–4.47)
LYMPHOCYTES NFR BLD AUTO: 28 % (ref 14–44)
MCH RBC QN AUTO: 30.6 PG (ref 26.8–34.3)
MCHC RBC AUTO-ENTMCNC: 35 G/DL (ref 31.4–37.4)
MCV RBC AUTO: 87 FL (ref 82–98)
MONOCYTES # BLD AUTO: 0.47 THOUSAND/ÂΜL (ref 0.17–1.22)
MONOCYTES NFR BLD AUTO: 6 % (ref 4–12)
NEUTROPHILS # BLD AUTO: 4.82 THOUSANDS/ÂΜL (ref 1.85–7.62)
NEUTS SEG NFR BLD AUTO: 64 % (ref 43–75)
NRBC BLD AUTO-RTO: 0 /100 WBCS
PLATELET # BLD AUTO: 245 THOUSANDS/UL (ref 149–390)
PMV BLD AUTO: 9.3 FL (ref 8.9–12.7)
POTASSIUM SERPL-SCNC: 4.1 MMOL/L (ref 3.5–5.3)
PROT SERPL-MCNC: 7.4 G/DL (ref 6.4–8.4)
RBC # BLD AUTO: 4.94 MILLION/UL (ref 3.88–5.62)
SODIUM SERPL-SCNC: 140 MMOL/L (ref 135–147)
TIBC SERPL-MCNC: 309.4 UG/DL (ref 250–450)
TRANSFERRIN SERPL-MCNC: 221 MG/DL (ref 203–362)
UIBC SERPL-MCNC: 133 UG/DL (ref 155–355)
VIT B12 SERPL-MCNC: 426 PG/ML (ref 180–914)
WBC # BLD AUTO: 7.59 THOUSAND/UL (ref 4.31–10.16)

## 2025-03-20 PROCEDURE — 36415 COLL VENOUS BLD VENIPUNCTURE: CPT

## 2025-03-20 PROCEDURE — 84630 ASSAY OF ZINC: CPT

## 2025-03-20 PROCEDURE — 82607 VITAMIN B-12: CPT

## 2025-03-20 PROCEDURE — 83550 IRON BINDING TEST: CPT

## 2025-03-20 PROCEDURE — 82728 ASSAY OF FERRITIN: CPT

## 2025-03-20 PROCEDURE — 85025 COMPLETE CBC W/AUTO DIFF WBC: CPT

## 2025-03-20 PROCEDURE — 80053 COMPREHEN METABOLIC PANEL: CPT

## 2025-03-20 PROCEDURE — 83540 ASSAY OF IRON: CPT

## 2025-03-23 LAB — ZINC SERPL-MCNC: 60 UG/DL (ref 44–115)
